# Patient Record
Sex: MALE | Race: BLACK OR AFRICAN AMERICAN | Employment: UNEMPLOYED | URBAN - METROPOLITAN AREA
[De-identification: names, ages, dates, MRNs, and addresses within clinical notes are randomized per-mention and may not be internally consistent; named-entity substitution may affect disease eponyms.]

---

## 2019-07-17 ENCOUNTER — HOSPITAL ENCOUNTER (EMERGENCY)
Age: 39
Discharge: HOME | End: 2019-07-17
Attending: EMERGENCY MEDICINE
Payer: SELF-PAY

## 2019-07-17 VITALS
OXYGEN SATURATION: 98 % | SYSTOLIC BLOOD PRESSURE: 156 MMHG | HEART RATE: 64 BPM | TEMPERATURE: 98 F | BODY MASS INDEX: 33.75 KG/M2 | RESPIRATION RATE: 20 BRPM | HEIGHT: 66 IN | DIASTOLIC BLOOD PRESSURE: 84 MMHG | WEIGHT: 210 LBS

## 2019-07-17 DIAGNOSIS — Z20.2 EXPOSURE TO STD: Primary | ICD-10-CM

## 2019-07-17 PROCEDURE — 99283 EMERGENCY DEPT VISIT LOW MDM: CPT

## 2019-07-17 PROCEDURE — 74011250636 HC RX REV CODE- 250/636: Performed by: NURSE PRACTITIONER

## 2019-07-17 PROCEDURE — 74011250637 HC RX REV CODE- 250/637: Performed by: NURSE PRACTITIONER

## 2019-07-17 RX ORDER — AZITHROMYCIN 500 MG/1
1000 TABLET, FILM COATED ORAL
Status: COMPLETED | OUTPATIENT
Start: 2019-07-17 | End: 2019-07-17

## 2019-07-17 RX ADMIN — AZITHROMYCIN 1000 MG: 500 TABLET, FILM COATED ORAL at 12:34

## 2019-07-17 RX ADMIN — LIDOCAINE HYDROCHLORIDE 250 MG: 10 INJECTION, SOLUTION EPIDURAL; INFILTRATION; INTRACAUDAL; PERINEURAL at 12:34

## 2019-07-17 NOTE — ED NOTES
Emergency Department Nursing Plan of Care       The Nursing Plan of Care is developed from the Nursing assessment and Emergency Department Attending provider initial evaluation. The plan of care may be reviewed in the ED Provider note.     The Plan of Care was developed with the following considerations:   Patient / Family readiness to learn indicated by:verbalized understanding  Persons(s) to be included in education: patient  Barriers to Learning/Limitations:No    [facility]    7/17/2019   12:22 PM

## 2019-07-17 NOTE — ED NOTES
Pt here for treatment of chlamydia. Denies urinary symptoms. Denies wanting to wait for urine sample and 'just wants to be treated'.

## 2019-07-17 NOTE — ED TRIAGE NOTES
Pt. Reports difficulty urinating X a couple days. Pt. Denies burning with urination/discharge. Pt. States sexual partner tested positive for Chlamydia.

## 2019-07-17 NOTE — ED PROVIDER NOTES
EMERGENCY DEPARTMENT HISTORY AND PHYSICAL EXAM    Date: 7/17/2019  Patient Name: Nickey Duverney    History of Presenting Illness     Chief Complaint   Patient presents with    Penile Discharge         History Provided By: Patient     Chief Complaint: Treatment for STD      HPI: Nickey Duverney is a 46 y.o. male with a PMH of No significant past medical history who presents with request for treatment for chlamydia. Patient states his partner told him she had chlamydia. His partner was treated. Patient denies penile discharge she denies dysuria hematuria flank pain abdominal pain nausea vomiting patient states she just wants to be treated for the STD. Because there are no symptoms or complaints of pain, there is no reported quality, severity, modifying factors, or associated signs and symptoms reported. PCP: No primary care provider on file. Past History     Past Medical History:  History reviewed. No pertinent past medical history. Past Surgical History:  History reviewed. No pertinent surgical history. Family History:  History reviewed. No pertinent family history. Social History:  Social History     Tobacco Use    Smoking status: Never Smoker    Smokeless tobacco: Never Used   Substance Use Topics    Alcohol use: Not Currently    Drug use: Not Currently       Allergies:  No Known Allergies      Review of Systems   Review of Systems   Constitutional: Negative for chills, fatigue and fever. HENT: Negative for congestion and sore throat. Eyes: Negative for redness. Respiratory: Negative for cough, chest tightness and wheezing. Cardiovascular: Negative for chest pain. Gastrointestinal: Negative for abdominal pain. Genitourinary: Negative for dysuria. Musculoskeletal: Negative for arthralgias, back pain, myalgias, neck pain and neck stiffness. Skin: Negative for rash. Neurological: Negative for dizziness, syncope, weakness, light-headedness, numbness and headaches.    Hematological: Negative for adenopathy. All other systems reviewed and are negative. Physical Exam     Vitals:    07/17/19 1218   BP: 156/84   Pulse: 64   Resp: 20   Temp: 98 °F (36.7 °C)   SpO2: 98%   Weight: 95.3 kg (210 lb)   Height: 5' 6\" (1.676 m)     Physical Exam   Constitutional: He is oriented to person, place, and time. He appears well-developed and well-nourished. HENT:   Head: Normocephalic and atraumatic. Right Ear: External ear normal.   Mouth/Throat: Oropharynx is clear and moist.   Eyes: Conjunctivae are normal. Right eye exhibits no discharge. Left eye exhibits no discharge. Neck: Normal range of motion. Neck supple. Cardiovascular: Normal rate, regular rhythm and normal heart sounds. Pulmonary/Chest: Effort normal and breath sounds normal. No respiratory distress. He has no wheezes. Abdominal: Soft. Bowel sounds are normal. There is no tenderness. Musculoskeletal: Normal range of motion. He exhibits no edema. Lymphadenopathy:     He has no cervical adenopathy. Neurological: He is alert and oriented to person, place, and time. No cranial nerve deficit. Skin: Skin is warm and dry. Psychiatric: He has a normal mood and affect. His behavior is normal. Judgment and thought content normal.   Nursing note and vitals reviewed. Diagnostic Study Results     Labs -   No results found for this or any previous visit (from the past 12 hour(s)). Radiologic Studies -   No orders to display     CT Results  (Last 48 hours)    None        CXR Results  (Last 48 hours)    None            Medical Decision Making   I am the first provider for this patient. I reviewed the vital signs, available nursing notes, past medical history, past surgical history, family history and social history. Vital Signs-Reviewed the patient's vital signs. Records Reviewed: Nursing Notes            Disposition:  Home    patient declined urinalysis stated he just wanted treatment for gonorrhea and chlamydia. Patient advised of need for urinalysis. Patient declined. 12:35 PM  I have discussed with patient their diagnosis, treatment, and follow up plan. The patient agrees to follow up as outlined in discharge paperwork and also to return to the ED with any worsening. Pam Valadez, NP      Follow-up Information     Follow up With Specialties Details Why [address]  In 1 week As needed [address] [id number]  [phone]          There are no discharge medications for this patient. Provider Notes (Medical Decision Making):   DDX exposure to STI MAGGIE gonorrhea  Procedures:  Procedures        Diagnosis     Clinical Impression:   1.  Exposure to STD

## 2019-07-17 NOTE — DISCHARGE INSTRUCTIONS
Patient Education        Exposure to Sexually Transmitted Infections: Care Instructions  Your Care Instructions  Sexually transmitted infections (STIs) are those diseases spread by sexual contact. There are at least 20 different STIs, including chlamydia, gonorrhea, syphilis, and human immunodeficiency virus (HIV), which causes AIDS. Bacteria-caused STIs can be treated and cured. STIs caused by viruses, such as HIV, can be treated but not cured. Some STIs can reduce a woman's chances of getting pregnant in the future. STIs are spread during sexual contact, such as vaginal intercourse and oral or anal sex. Follow-up care is a key part of your treatment and safety. Be sure to make and go to all appointments, and call your doctor if you are having problems. It's also a good idea to know your test results and keep a list of the medicines you take. How can you care for yourself at home? · Your doctor may have given you a shot of antibiotics. If your doctor prescribed antibiotic pills, take them as directed. Do not stop taking them just because you feel better. You need to take the full course of antibiotics. · Do not have sexual contact while you have symptoms of an STI or are being treated for an STI. · Tell your sex partner (or partners) that he or she will need treatment. · If you are a woman, do not douche. Douching changes the normal balance of bacteria in the vagina and may spread an infection up into your reproductive organs. To prevent exposure to STIs in the future  · Use latex condoms every time you have sex. Use them from the beginning to the end of sexual contact. · Talk to your partner before you have sex. Find out if he or she has or is at risk for any STI. Keep in mind that a person may be able to spread an STI even if he or she does not have symptoms. · Do not have sex if you are being treated for an STI.   · Do not have sex with anyone who has symptoms of an STI, such as sores on the genitals or mouth.  · Having one sex partner (who does not have STIs and does not have sex with anyone else) is a good way to avoid STIs. When should you call for help? Call your doctor now or seek immediate medical care if:    · You have new pain in your belly or pelvis.     · You have symptoms of a urinary tract infection. These may include:  ? Pain or burning when you urinate. ? A frequent need to urinate without being able to pass much urine. ? Pain in the flank, which is just below the rib cage and above the waist on either side of the back. ? Blood in your urine. ? A fever.     · You have new or worsening pain or swelling in the scrotum.    Watch closely for changes in your health, and be sure to contact your doctor if:    · You have unusual vaginal bleeding.     · You have a discharge from the vagina or penis.     · You have any new symptoms, such as sores, bumps, rashes, blisters, or warts.     · You have itching, tingling, pain, or burning in the genital or anal area.     · You think you may have an STI. Where can you learn more? Go to http://xiomara-jacob.info/. Enter S719 in the search box to learn more about \"Exposure to Sexually Transmitted Infections: Care Instructions. \"  Current as of: September 11, 2018  Content Version: 11.9  © 5422-0583 [employer]. Care instructions adapted under license by [employer] (which disclaims liability or warranty for this information). If you have questions about a medical condition or this instruction, always ask your healthcare professional. [place] [address] any warranty or liability for your use of this information.

## 2023-03-19 NOTE — PROGRESS NOTES
Patient is new to this clinic. Comes in to establish care. Previous care was with . Reason for the change is: CC dyspnea      Reid Navarro (: 1980) is a 40 y.o. male, new patient, here for evaluation of the following chief complaint(s):  No chief complaint on file. Complains of shortness of breath symptoms noted more in the last few months    ASSESSMENT/PLAN:  Below is the assessment and plan developed based on review of pertinent history, physical exam, labs, studies, and medications. ICD-10-CM ICD-9-CM    1. Dyspnea, unspecified type  R06.00 786.09 AMB POC EKG ROUTINE W/ 12 LEADS, INTER & REP      METABOLIC PANEL, COMPREHENSIVE      CBC W/O DIFF      TSH 3RD GENERATION      XR CHEST PA LAT      METABOLIC PANEL, COMPREHENSIVE      CBC W/O DIFF      TSH 3RD GENERATION      2. Mixed hyperlipidemia  E78.2 272.2 LIPID PANEL      LIPID PANEL      3. Constipation, unspecified constipation type  K59.00 564.00         Orders Placed This Encounter    XR CHEST PA LAT     Standing Status:   Future     Standing Expiration Date:   2024    LIPID PANEL     Standing Status:   Future     Number of Occurrences:   1     Standing Expiration Date:       METABOLIC PANEL, COMPREHENSIVE     Standing Status:   Future     Number of Occurrences:   1     Standing Expiration Date:   2023    CBC W/O DIFF     Standing Status:   Future     Number of Occurrences:   1     Standing Expiration Date:   2023    TSH 3RD GENERATION     Standing Status:   Future     Number of Occurrences:   1     Standing Expiration Date:   2023    AMB POC EKG ROUTINE W/ 12 LEADS, INTER & REP     Order Specific Question:   Reason for Exam:     Answer:   dyspnea uns     Work-up of the symptoms as above  Take daily laxative recommend Metamucil. Most likely COPD  The patient was counseled on the dangers of tobacco use, and was advised to quit.   Reviewed strategies to maximize success, including stress management. Follow-up 3 weeks discussed labs results. And routine health maintenance      SUBJECTIVE/OBJECTIVE:  HPI  Shortness of breath note some weight gain specially in the abdomen area. Gets dyspneic with more exertions. Switch from smoking cigars to vaping nicotine a few months ago but he smoked for years. Has quit before going to do so again. Wants a physical exam to rule out diabetes other causes of his symptoms. Review of Systems   Constitutional:  Negative for activity change. Respiratory:  Positive for shortness of breath. Negative for chest tightness. Gastrointestinal:  Positive for abdominal distention and constipation. Negative for abdominal pain. Bowels are irregular once every few days gets a large BM. There is no problem list on file for this patient. No family history on file. Social History     Socioeconomic History    Marital status: SINGLE     Spouse name: Not on file    Number of children: Not on file    Years of education: Not on file    Highest education level: Not on file   Occupational History    Occupation:    Tobacco Use    Smoking status: Former     Types: Cigars     Start date: 1999     Quit date: 2023     Years since quittin.2     Passive exposure: Never    Smokeless tobacco: Never   Vaping Use    Vaping Use: Every day    Substances: Nicotine   Substance and Sexual Activity    Alcohol use:  Yes     Alcohol/week: 20.0 standard drinks     Types: 20 Shots of liquor per week    Drug use: Never    Sexual activity: Not on file   Other Topics Concern    Not on file   Social History Narrative    Lives with parents     Social Determinants of Health     Financial Resource Strain: Low Risk     Difficulty of Paying Living Expenses: Not hard at all   Food Insecurity: No Food Insecurity    Worried About Running Out of Food in the Last Year: Never true    Ran Out of Food in the Last Year: Never true   Transportation Needs: No Transportation Needs    Lack of Transportation (Medical): No    Lack of Transportation (Non-Medical): No   Physical Activity: Not on file   Stress: No Stress Concern Present    Feeling of Stress : Only a little   Social Connections: Not on file   Intimate Partner Violence: Not At Risk    Fear of Current or Ex-Partner: No    Emotionally Abused: No    Physically Abused: No    Sexually Abused: No   Housing Stability: Unknown    Unable to Pay for Housing in the Last Year: No    Number of [place] in the Last Year: Not on file    Unstable Housing in the Last Year: No       Physical Exam  Visit Vitals  /84 (BP 1 Location: Left arm, BP Patient Position: Sitting, BP Cuff Size: Adult)   Pulse 70   Temp 98.6 °F (37 °C) (Temporal)   Resp 16   Ht 5' 6\" (1.676 m)   Wt 225 lb (102.1 kg)   SpO2 97%   BMI 36.32 kg/m²       WDWN NAD  TM clear, throat wnl  Neck no adenopathy  Heart RRR no C/M/R  Lungs CTA  Abdo soft non tender  Ext No redness swelling or edema    EKG is within normal limits      An electronic signature was used to authenticate this note.   -- Bobby Goldmann, MD

## 2023-03-20 ENCOUNTER — OFFICE VISIT (OUTPATIENT)
Dept: INTERNAL MEDICINE CLINIC | Age: 43
End: 2023-03-20
Payer: MEDICAID

## 2023-03-20 VITALS
HEART RATE: 70 BPM | BODY MASS INDEX: 36.16 KG/M2 | TEMPERATURE: 98.6 F | DIASTOLIC BLOOD PRESSURE: 84 MMHG | RESPIRATION RATE: 16 BRPM | WEIGHT: 225 LBS | SYSTOLIC BLOOD PRESSURE: 127 MMHG | OXYGEN SATURATION: 97 % | HEIGHT: 66 IN

## 2023-03-20 DIAGNOSIS — R06.00 DYSPNEA, UNSPECIFIED TYPE: Primary | ICD-10-CM

## 2023-03-20 DIAGNOSIS — K59.00 CONSTIPATION, UNSPECIFIED CONSTIPATION TYPE: ICD-10-CM

## 2023-03-20 DIAGNOSIS — E78.2 MIXED HYPERLIPIDEMIA: ICD-10-CM

## 2023-03-20 PROCEDURE — 99203 OFFICE O/P NEW LOW 30 MIN: CPT | Performed by: FAMILY MEDICINE

## 2023-03-20 NOTE — PROGRESS NOTES
Chief Complaint   Patient presents with    Establish Care     Patient has not been out of the country in (14 months), NO diarrhea, NO cough, NO chest conjestion, NO temp. Pt has not been around anyone with these symptoms. Health Maintenance reviewed. I have reviewed the patient's medical history in detail and updated the computerized patient record. 1. \"Have you been to the ER, urgent care clinic since your last visit? No Hospitalized since your last visit? \" no    2. \"Have you seen or consulted any other health care providers outside of the [address] [place] [address] since your last visit? \" no   3. For patients aged 39-70: Has the patient had a colonoscopy / FIT/ Cologuard? no    Encouraged pt to discuss pt's wishes with spouse/partner/family and bring them in the next appt to follow thru with the Advanced Directive    @No flowsheet data found. 3 most recent PHQ Screens 3/20/2023   Little interest or pleasure in doing things Several days   Feeling down, depressed, irritable, or hopeless Several days   Total Score PHQ 2 2       Abuse Screening Questionnaire 3/20/2023   Do you ever feel afraid of your partner? N   Are you in a relationship with someone who physically or mentally threatens you? N   Is it safe for you to go home?  Y       ADL Assessment 3/20/2023   Feeding yourself No Help Needed   Getting from bed to chair No Help Needed   Getting dressed No Help Needed   Bathing or showering No Help Needed   Walk across the room (includes cane/walker) No Help Needed   Using the telphone No Help Needed   Taking your medications No Help Needed   Preparing meals No Help Needed   Managing money (expenses/bills) No Help Needed   Moderately strenuous housework (laundry) No Help Needed   Shopping for personal items (toiletries/medicines) No Help Needed   Shopping for groceries No Help Needed   Driving No Help Needed   Climbing a flight of stairs No Help Needed   Getting to places beyond walking distances No Help Needed

## 2023-03-30 ENCOUNTER — TELEPHONE (OUTPATIENT)
Dept: INTERNAL MEDICINE CLINIC | Age: 43
End: 2023-03-30

## 2023-04-17 ENCOUNTER — TELEPHONE (OUTPATIENT)
Dept: INTERNAL MEDICINE CLINIC | Age: 43
End: 2023-04-17

## 2023-06-01 ENCOUNTER — TELEMEDICINE (OUTPATIENT)
Facility: CLINIC | Age: 43
End: 2023-06-01
Payer: MEDICAID

## 2023-06-01 DIAGNOSIS — N52.9 ERECTILE DYSFUNCTION, UNSPECIFIED ERECTILE DYSFUNCTION TYPE: ICD-10-CM

## 2023-06-01 DIAGNOSIS — Z11.59 ENCOUNTER FOR HCV SCREENING TEST FOR LOW RISK PATIENT: ICD-10-CM

## 2023-06-01 DIAGNOSIS — K59.01 SLOW TRANSIT CONSTIPATION: Primary | ICD-10-CM

## 2023-06-01 PROCEDURE — 99214 OFFICE O/P EST MOD 30 MIN: CPT | Performed by: FAMILY MEDICINE

## 2023-06-03 DIAGNOSIS — K59.01 SLOW TRANSIT CONSTIPATION: ICD-10-CM

## 2023-06-03 PROBLEM — N52.9 ERECTILE DYSFUNCTION: Status: ACTIVE | Noted: 2023-06-03

## 2023-06-05 ENCOUNTER — TELEPHONE (OUTPATIENT)
Facility: CLINIC | Age: 43
End: 2023-06-05

## 2023-06-05 NOTE — TELEPHONE ENCOUNTER
PCP Henrietta Hashimoto, MD added more labs for patient to have completed. Called patient and verified name and date of birth. Pt has appt with gastro Friday. Will  lab orders in office.

## 2024-02-10 ENCOUNTER — APPOINTMENT (OUTPATIENT)
Facility: HOSPITAL | Age: 44
End: 2024-02-10

## 2024-02-10 ENCOUNTER — HOSPITAL ENCOUNTER (EMERGENCY)
Facility: HOSPITAL | Age: 44
Discharge: HOME | End: 2024-02-10
Attending: STUDENT IN AN ORGANIZED HEALTH CARE EDUCATION/TRAINING PROGRAM

## 2024-02-10 VITALS
DIASTOLIC BLOOD PRESSURE: 91 MMHG | WEIGHT: 199 LBS | OXYGEN SATURATION: 99 % | RESPIRATION RATE: 20 BRPM | BODY MASS INDEX: 31.98 KG/M2 | SYSTOLIC BLOOD PRESSURE: 148 MMHG | HEIGHT: 66 IN | TEMPERATURE: 98.4 F | HEART RATE: 88 BPM

## 2024-02-10 DIAGNOSIS — I26.99 PULMONARY INFARCT (HCC): ICD-10-CM

## 2024-02-10 DIAGNOSIS — I26.99 ACUTE PULMONARY EMBOLISM WITHOUT ACUTE COR PULMONALE, UNSPECIFIED PULMONARY EMBOLISM TYPE (HCC): Primary | ICD-10-CM

## 2024-02-10 LAB
ALBUMIN SERPL-MCNC: 3.9 G/DL (ref 3.5–5)
ALBUMIN/GLOB SERPL: 0.8 (ref 1.1–2.2)
ALP SERPL-CCNC: 83 U/L (ref 45–117)
ALT SERPL-CCNC: 33 U/L (ref 12–78)
ANION GAP SERPL CALC-SCNC: 10 MMOL/L (ref 5–15)
AST SERPL-CCNC: 19 U/L (ref 15–37)
BASOPHILS # BLD: 0 K/UL (ref 0–0.1)
BASOPHILS NFR BLD: 0 % (ref 0–1)
BILIRUB SERPL-MCNC: 0.4 MG/DL (ref 0.2–1)
BUN SERPL-MCNC: 8 MG/DL (ref 6–20)
BUN/CREAT SERPL: 7 (ref 12–20)
CALCIUM SERPL-MCNC: 9.1 MG/DL (ref 8.5–10.1)
CHLORIDE SERPL-SCNC: 103 MMOL/L (ref 97–108)
CO2 SERPL-SCNC: 30 MMOL/L (ref 21–32)
CREAT SERPL-MCNC: 1.19 MG/DL (ref 0.7–1.3)
D DIMER PPP FEU-MCNC: 0.61 MG/L FEU (ref 0–0.65)
DIFFERENTIAL METHOD BLD: ABNORMAL
EKG ATRIAL RATE: 87 BPM
EKG DIAGNOSIS: NORMAL
EKG P AXIS: 36 DEGREES
EKG P-R INTERVAL: 154 MS
EKG Q-T INTERVAL: 338 MS
EKG QRS DURATION: 80 MS
EKG QTC CALCULATION (BAZETT): 406 MS
EKG R AXIS: 95 DEGREES
EKG T AXIS: 29 DEGREES
EKG VENTRICULAR RATE: 87 BPM
EOSINOPHIL # BLD: 0 K/UL (ref 0–0.4)
EOSINOPHIL NFR BLD: 1 % (ref 0–7)
ERYTHROCYTE [DISTWIDTH] IN BLOOD BY AUTOMATED COUNT: 12.9 % (ref 11.5–14.5)
GLOBULIN SER CALC-MCNC: 4.7 G/DL (ref 2–4)
GLUCOSE SERPL-MCNC: 105 MG/DL (ref 65–100)
HCT VFR BLD AUTO: 52.5 % (ref 36.6–50.3)
HGB BLD-MCNC: 17.6 G/DL (ref 12.1–17)
IMM GRANULOCYTES # BLD AUTO: 0 K/UL (ref 0–0.04)
IMM GRANULOCYTES NFR BLD AUTO: 0 % (ref 0–0.5)
LIPASE SERPL-CCNC: 32 U/L (ref 13–75)
LYMPHOCYTES # BLD: 1.6 K/UL (ref 0.8–3.5)
LYMPHOCYTES NFR BLD: 27 % (ref 12–49)
MAGNESIUM SERPL-MCNC: 2 MG/DL (ref 1.6–2.4)
MCH RBC QN AUTO: 30.1 PG (ref 26–34)
MCHC RBC AUTO-ENTMCNC: 33.5 G/DL (ref 30–36.5)
MCV RBC AUTO: 89.9 FL (ref 80–99)
MONOCYTES # BLD: 0.5 K/UL (ref 0–1)
MONOCYTES NFR BLD: 8 % (ref 5–13)
NEUTS SEG # BLD: 3.8 K/UL (ref 1.8–8)
NEUTS SEG NFR BLD: 64 % (ref 32–75)
NRBC # BLD: 0 K/UL (ref 0–0.01)
NRBC BLD-RTO: 0 PER 100 WBC
PLATELET # BLD AUTO: 164 K/UL (ref 150–400)
PMV BLD AUTO: 10.5 FL (ref 8.9–12.9)
POTASSIUM SERPL-SCNC: 3.9 MMOL/L (ref 3.5–5.1)
PROT SERPL-MCNC: 8.6 G/DL (ref 6.4–8.2)
RBC # BLD AUTO: 5.84 M/UL (ref 4.1–5.7)
SODIUM SERPL-SCNC: 143 MMOL/L (ref 136–145)
TROPONIN I SERPL HS-MCNC: 8 NG/L (ref 0–76)
WBC # BLD AUTO: 6 K/UL (ref 4.1–11.1)

## 2024-02-10 PROCEDURE — 85379 FIBRIN DEGRADATION QUANT: CPT

## 2024-02-10 PROCEDURE — 71045 X-RAY EXAM CHEST 1 VIEW: CPT

## 2024-02-10 PROCEDURE — 2580000003 HC RX 258: Performed by: STUDENT IN AN ORGANIZED HEALTH CARE EDUCATION/TRAINING PROGRAM

## 2024-02-10 PROCEDURE — 6370000000 HC RX 637 (ALT 250 FOR IP): Performed by: STUDENT IN AN ORGANIZED HEALTH CARE EDUCATION/TRAINING PROGRAM

## 2024-02-10 PROCEDURE — 84484 ASSAY OF TROPONIN QUANT: CPT

## 2024-02-10 PROCEDURE — 71275 CT ANGIOGRAPHY CHEST: CPT

## 2024-02-10 PROCEDURE — 85025 COMPLETE CBC W/AUTO DIFF WBC: CPT

## 2024-02-10 PROCEDURE — 83690 ASSAY OF LIPASE: CPT

## 2024-02-10 PROCEDURE — 83735 ASSAY OF MAGNESIUM: CPT

## 2024-02-10 PROCEDURE — 96374 THER/PROPH/DIAG INJ IV PUSH: CPT

## 2024-02-10 PROCEDURE — 93005 ELECTROCARDIOGRAM TRACING: CPT | Performed by: STUDENT IN AN ORGANIZED HEALTH CARE EDUCATION/TRAINING PROGRAM

## 2024-02-10 PROCEDURE — 80053 COMPREHEN METABOLIC PANEL: CPT

## 2024-02-10 PROCEDURE — 6360000004 HC RX CONTRAST MEDICATION: Performed by: STUDENT IN AN ORGANIZED HEALTH CARE EDUCATION/TRAINING PROGRAM

## 2024-02-10 PROCEDURE — 6360000002 HC RX W HCPCS: Performed by: STUDENT IN AN ORGANIZED HEALTH CARE EDUCATION/TRAINING PROGRAM

## 2024-02-10 PROCEDURE — 99285 EMERGENCY DEPT VISIT HI MDM: CPT

## 2024-02-10 PROCEDURE — 96361 HYDRATE IV INFUSION ADD-ON: CPT

## 2024-02-10 RX ORDER — 0.9 % SODIUM CHLORIDE 0.9 %
1000 INTRAVENOUS SOLUTION INTRAVENOUS ONCE
Status: COMPLETED | OUTPATIENT
Start: 2024-02-10 | End: 2024-02-10

## 2024-02-10 RX ORDER — KETOROLAC TROMETHAMINE 30 MG/ML
30 INJECTION, SOLUTION INTRAMUSCULAR; INTRAVENOUS
Status: COMPLETED | OUTPATIENT
Start: 2024-02-10 | End: 2024-02-10

## 2024-02-10 RX ADMIN — KETOROLAC TROMETHAMINE 30 MG: 30 INJECTION INTRAMUSCULAR; INTRAVENOUS at 00:51

## 2024-02-10 RX ADMIN — SODIUM CHLORIDE 1000 ML: 9 INJECTION, SOLUTION INTRAVENOUS at 01:38

## 2024-02-10 RX ADMIN — IOPAMIDOL 100 ML: 755 INJECTION, SOLUTION INTRAVENOUS at 02:21

## 2024-02-10 RX ADMIN — APIXABAN 10 MG: 5 TABLET, FILM COATED ORAL at 03:21

## 2024-02-10 ASSESSMENT — PAIN - FUNCTIONAL ASSESSMENT: PAIN_FUNCTIONAL_ASSESSMENT: 0-10

## 2024-02-10 ASSESSMENT — PAIN SCALES - GENERAL: PAINLEVEL_OUTOF10: 10

## 2024-02-10 NOTE — ED NOTES
Patient (s)  given copy of dc instructions and 1 script(s).  Patient (s)  verbalized understanding of instructions and script (s).  Patient given a current medication reconciliation form and verbalized understanding of their medications.   Patient (s) verbalized understanding of the importance of discussing medications with  his or her physician or clinic they will be following up with.  Patient alert and oriented and in no acute distress.  Patient discharged home ambulatory with significant other.

## 2024-02-10 NOTE — ED PROVIDER NOTES
[facility] EMERGENCY DEPT  EMERGENCY DEPARTMENT ENCOUNTER       Pt Name: Rodrick Gamboa  MRN: [id number]  Birthdate 1980  Date of evaluation: 2/10/2024  Provider: Nathaniel Sanchez MD   PCP: Harjinder Amaya MD  Note Started: 12:36 AM EST 2/10/24     CHIEF COMPLAINT       Chief Complaint   Patient presents with    Abdominal Cramping    torso cramping        HISTORY OF PRESENT ILLNESS: 1 or more elements      History From: Patient  HPI Limitations: None     Rodrick Gamboa is a 43 y.o. male who presents for right-sided chest pain that started earlier today.  Patient denies any past medical history.  Takes no prescription medications.  Denies nausea, vomiting.  He reports the pain is on the right side of his chest, radiates to the right shoulder blade.  No history of this pain.  He reports the pain is worse with deep breathing.  No history of PE or DVT.  Denies falls or injuries.  Denies history of heart disease.  Tobacco smoker, social alcohol use, denies drug use.  Denies nausea, vomiting, diarrhea, rash, fever, chills.  Denies recent illnesses.  Denies cough.       Nursing Notes were all reviewed and agreed with or any disagreements were addressed in the HPI.     REVIEW OF SYSTEMS      Review of Systems     Positives and Pertinent negatives as per HPI.    PAST HISTORY     Past Medical History:  History reviewed. No pertinent past medical history.      Past Surgical History:  Past Surgical History:   Procedure Laterality Date    FRACTURE SURGERY Right     hand       Family History:  History reviewed. No pertinent family history.    Social History:  Social History     Tobacco Use    Smoking status: Some Days     Types: Cigars    Smokeless tobacco: Never   Substance Use Topics    Alcohol use: Yes     Alcohol/week: 20.0 standard drinks of alcohol    Drug use: Never       Allergies:  No Known Allergies    CURRENT MEDICATIONS      Discharge Medication List as of 2/10/2024  2:55 AM          SCREENINGS               No data recorded  errors are inadvertently transcribed by the computer software. Please disregards these errors. Please excuse any errors that have escaped final proofreading.)         Nathaniel Sanchez MD  02/10/24 0408

## 2024-02-10 NOTE — ED TRIAGE NOTES
Pt presents to ED with pain in his right chest and right abd for several days. Pt reports pain is cramping and constant. Pain increases with respirations. Pt denies SOB. Pt is a+ox4. Skin is warm and dry. Respirations are even and unlabored.

## 2024-02-10 NOTE — ED NOTES
Pt presents ambulatory to ED with c/o constant, right sided chest pain radiating to right upper side of back that started on 2/8/24. Pt reports pain worsens with inspiration, denies SOB otherwise. Pt denies N/V/D, cough, or extremity swelling. Pt denies recent falls or injuries. Pt is A&Ox4, RR even & unlabored, skin is warm & dry. Pt in NAD, updated on plan of care, call light within reach.      Emergency Department Nursing Plan of Care       The Nursing Plan of Care is developed from the Nursing assessment and Emergency Department Attending provider initial evaluation.  The plan of care may be reviewed in the “ED Provider note”.    The Plan of Care was developed with the following considerations:   Patient / Family readiness to learn indicated by:verbalized understanding  Persons(s) to be included in education: patient  Barriers to Learning/Limitations: No    Signed     Melissa Neves RN    2/10/2024   2:40 AM

## 2024-02-10 NOTE — PROGRESS NOTES
Apixaban Order Review  Indication: treatment DVT/PE  Dose: 10mg once dose   Current Labs:  Recent Labs     02/10/24  0046   CREATININE 1.19   HGB 17.6*        Est CrCL: 85 ml/min    Drug Interactions: none   Plan: Continue current regimen    [facility] DOAC Dosing Guide

## 2024-02-12 LAB
EKG ATRIAL RATE: 87 BPM
EKG DIAGNOSIS: NORMAL
EKG P AXIS: 36 DEGREES
EKG P-R INTERVAL: 154 MS
EKG Q-T INTERVAL: 338 MS
EKG QRS DURATION: 80 MS
EKG QTC CALCULATION (BAZETT): 406 MS
EKG R AXIS: 95 DEGREES
EKG T AXIS: 29 DEGREES
EKG VENTRICULAR RATE: 87 BPM

## 2024-02-12 PROCEDURE — 93010 ELECTROCARDIOGRAM REPORT: CPT | Performed by: SPECIALIST

## 2025-01-09 ENCOUNTER — HOSPITAL ENCOUNTER (EMERGENCY)
Facility: HOSPITAL | Age: 45
Discharge: HOME | End: 2025-01-09
Attending: EMERGENCY MEDICINE
Payer: MEDICAID

## 2025-01-09 VITALS
SYSTOLIC BLOOD PRESSURE: 170 MMHG | BODY MASS INDEX: 32.05 KG/M2 | OXYGEN SATURATION: 99 % | HEIGHT: 65 IN | RESPIRATION RATE: 19 BRPM | HEART RATE: 71 BPM | WEIGHT: 192.35 LBS | TEMPERATURE: 98.7 F | DIASTOLIC BLOOD PRESSURE: 105 MMHG

## 2025-01-09 DIAGNOSIS — J06.9 ACUTE UPPER RESPIRATORY INFECTION: Primary | ICD-10-CM

## 2025-01-09 DIAGNOSIS — R03.0 ELEVATED BLOOD PRESSURE READING: ICD-10-CM

## 2025-01-09 LAB
FLUAV RNA SPEC QL NAA+PROBE: NOT DETECTED
FLUBV RNA SPEC QL NAA+PROBE: NOT DETECTED
SARS-COV-2 RNA RESP QL NAA+PROBE: NOT DETECTED
SOURCE: NORMAL

## 2025-01-09 PROCEDURE — 99283 EMERGENCY DEPT VISIT LOW MDM: CPT

## 2025-01-09 PROCEDURE — 87636 SARSCOV2 & INF A&B AMP PRB: CPT

## 2025-01-09 RX ORDER — METHOCARBAMOL 500 MG/1
1000 TABLET, FILM COATED ORAL
Status: DISCONTINUED | OUTPATIENT
Start: 2025-01-09 | End: 2025-01-10 | Stop reason: HOSPADM

## 2025-01-09 RX ORDER — FLUTICASONE PROPIONATE 50 MCG
1 SPRAY, SUSPENSION (ML) NASAL DAILY
Qty: 1 EACH | Refills: 0 | Status: SHIPPED | OUTPATIENT
Start: 2025-01-09 | End: 2025-01-23

## 2025-01-09 RX ORDER — GUAIFENESIN/DEXTROMETHORPHAN 100-10MG/5
5 SYRUP ORAL 3 TIMES DAILY PRN
Qty: 120 ML | Refills: 0 | Status: SHIPPED | OUTPATIENT
Start: 2025-01-09 | End: 2025-01-09

## 2025-01-09 RX ORDER — GUAIFENESIN/DEXTROMETHORPHAN 100-10MG/5
5 SYRUP ORAL 3 TIMES DAILY PRN
Qty: 120 ML | Refills: 0 | Status: SHIPPED | OUTPATIENT
Start: 2025-01-09 | End: 2025-01-19

## 2025-01-09 RX ORDER — FLUTICASONE PROPIONATE 50 MCG
1 SPRAY, SUSPENSION (ML) NASAL DAILY
Qty: 1 EACH | Refills: 0 | Status: SHIPPED | OUTPATIENT
Start: 2025-01-09 | End: 2025-01-09

## 2025-01-09 ASSESSMENT — PAIN SCALES - GENERAL: PAINLEVEL_OUTOF10: 8

## 2025-01-09 ASSESSMENT — PAIN DESCRIPTION - DESCRIPTORS: DESCRIPTORS: ACHING

## 2025-01-09 ASSESSMENT — LIFESTYLE VARIABLES
HOW OFTEN DO YOU HAVE A DRINK CONTAINING ALCOHOL: NEVER
HOW MANY STANDARD DRINKS CONTAINING ALCOHOL DO YOU HAVE ON A TYPICAL DAY: PATIENT DOES NOT DRINK

## 2025-01-09 ASSESSMENT — PAIN - FUNCTIONAL ASSESSMENT: PAIN_FUNCTIONAL_ASSESSMENT: 0-10

## 2025-01-09 ASSESSMENT — PAIN DESCRIPTION - LOCATION: LOCATION: GENERALIZED

## 2025-01-10 NOTE — ED PROVIDER NOTES
[facility] EMERGENCY DEPARTMENT  EMERGENCY DEPARTMENT ENCOUNTER       Pt Name: Rodrick Gamboa  MRN: [id number]  Birthdate 1980  Date of evaluation: 1/9/2025  Provider: Daniel Hart MD   PCP: Harjinder Amaya MD  Note Started: 9:24 PM EST 1/9/25     CHIEF COMPLAINT       Chief Complaint   Patient presents with    Cold Symptoms     Body aches, congestion x2 days        HISTORY OF PRESENT ILLNESS: 1 or more elements      History From: patient, History limited by: none     Rodrick Gamboa is a 44 y.o. male with a history as below anticoagulated on eliquis presents to the emergency department with a chief complaint of myalgias.    Patient denies any sick contacts, but reports 2 to 3 days of congestion, sore throat and nonproductive cough.  Reports myalgias as well as feeling \"hot and cold.\"  Reports no chest pain, no shortness of breath.  No abdominal pain, nausea, vomiting or diarrhea.     Please See MDM for Additional Details of the HPI/PMH  Nursing Notes were all reviewed and agreed with or any disagreements were addressed in the HPI.     REVIEW OF SYSTEMS        Positives and Pertinent negatives as per HPI.    PAST HISTORY     Past Medical History:  No past medical history on file.    Past Surgical History:  Past Surgical History:   Procedure Laterality Date    FRACTURE SURGERY Right     hand       Family History:  No family history on file.    Social History:  Social History     Tobacco Use    Smoking status: Some Days     Types: Cigars    Smokeless tobacco: Never   Substance Use Topics    Alcohol use: Yes     Alcohol/week: 20.0 standard drinks of alcohol    Drug use: Never       Allergies:  No Known Allergies    CURRENT MEDICATIONS      Current Discharge Medication List        CONTINUE these medications which have NOT CHANGED    Details   apixaban (ELIQUIS) 5 MG TABS tablet Take 2 tablets by mouth 2 times daily for 7 days  Qty: 28 tablet, Refills: 0      apixaban (ELIQUIS) 5 MG TABS tablet Take 1 tablet by

## 2025-01-10 NOTE — ED NOTES
Discharge instructions were given to the patient by Blank MCLAUGHLIN.     The patient left the Emergency Department alert and oriented and in no acute distress with 2 prescriptions. The patient was encouraged to call or return to the ED for worsening issues or problems and was encouraged to schedule a follow up appointment for continuing care.     Ambulation assessment completed before discharge.  Pt left Emergency Department ambulating at baseline with no ortho devices  Ortho device education: none    The patient verbalized understanding of discharge instructions and prescriptions, all questions were answered. The patient has no further concerns at this time.

## 2025-01-10 NOTE — ED TRIAGE NOTES
Pt presents to ED with CC body aches, cough, congestion x2 days  Pt reports taking Theraflu at 4pm, no relief

## 2025-01-10 NOTE — ED NOTES
Pt presents ambulatory to ED complaining of persistent dry cough, body aches and congestion for 2 days. Pt reports taking Theraflu and Mucinex without relief. BP elevated on arrival. Pt is not on BP meds. Pt is alert and oriented x 4, RR even and unlabored, skin is warm and dry. Assesment completed and pt updated on plan of care.       Emergency Department Nursing Plan of Care       The Nursing Plan of Care is developed from the Nursing assessment and Emergency Department Attending provider initial evaluation.  The plan of care may be reviewed in the “ED Provider note”.    The Plan of Care was developed with the following considerations:   Patient / Family readiness to learn indicated by:verbalized understanding  Persons(s) to be included in education: patient  Barriers to Learning/Limitations:None    Signed     Blank Hernandez RN    1/9/2025   9:17 PM

## 2025-01-10 NOTE — DISCHARGE INSTRUCTIONS
You are seen in the department for your symptoms.  Please use the nose spray and cough medication.  Please follow-up with your primary care doctor return for new or worsening symptoms anytime.

## 2025-02-26 ENCOUNTER — HOSPITAL ENCOUNTER (EMERGENCY)
Facility: HOSPITAL | Age: 45
Discharge: HOME | End: 2025-02-26
Payer: MEDICAID

## 2025-02-26 VITALS
DIASTOLIC BLOOD PRESSURE: 100 MMHG | HEART RATE: 59 BPM | TEMPERATURE: 98.5 F | RESPIRATION RATE: 16 BRPM | BODY MASS INDEX: 31.46 KG/M2 | SYSTOLIC BLOOD PRESSURE: 160 MMHG | WEIGHT: 195.77 LBS | OXYGEN SATURATION: 98 % | HEIGHT: 66 IN

## 2025-02-26 DIAGNOSIS — M79.601 PAIN OF RIGHT UPPER EXTREMITY: Primary | ICD-10-CM

## 2025-02-26 DIAGNOSIS — R20.2 PARESTHESIA: ICD-10-CM

## 2025-02-26 PROCEDURE — 99283 EMERGENCY DEPT VISIT LOW MDM: CPT

## 2025-02-26 RX ORDER — PREDNISONE 10 MG/1
TABLET ORAL
Qty: 1 EACH | Refills: 0 | Status: SHIPPED | OUTPATIENT
Start: 2025-02-26

## 2025-02-26 RX ORDER — OXYCODONE AND ACETAMINOPHEN 5; 325 MG/1; MG/1
1 TABLET ORAL EVERY 6 HOURS PRN
Qty: 12 TABLET | Refills: 0 | Status: SHIPPED | OUTPATIENT
Start: 2025-02-26 | End: 2025-03-01

## 2025-02-26 ASSESSMENT — LIFESTYLE VARIABLES
HOW OFTEN DO YOU HAVE A DRINK CONTAINING ALCOHOL: 2-3 TIMES A WEEK
HOW MANY STANDARD DRINKS CONTAINING ALCOHOL DO YOU HAVE ON A TYPICAL DAY: 1 OR 2

## 2025-02-26 ASSESSMENT — PAIN - FUNCTIONAL ASSESSMENT: PAIN_FUNCTIONAL_ASSESSMENT: NONE - DENIES PAIN

## 2025-02-26 ASSESSMENT — PAIN SCALES - GENERAL: PAINLEVEL_OUTOF10: 0

## 2025-02-27 NOTE — ED PROVIDER NOTES
[facility] EMERGENCY DEPT  EMERGENCY DEPARTMENT HISTORY AND PHYSICAL EXAM      Date: 2/26/2025  Patient Name: Rodrick Gamboa  MRN: [id number]  YOB: 1980  Date of evaluation: 2/26/2025  Provider: BREA Loco NP   Note Started: 9:04 PM EST 2/26/25    HISTORY OF PRESENT ILLNESS     Chief Complaint   Patient presents with    Numbness     Patient to triage with R sided tingling/numbness from R neck to fingertips starting one week ago. Patient saw PCP yesterday and was prescribed meloxicam; prescription is not relieving any symptoms. Patient denies any additional symptoms.       History Provided By: Patient    HPI: Rodrick Gamboa is a 44 y.o. male with past medical history as listed below, presents ambulatory to the emergency department with complaints of a numbness/tingling sensation in his right arm and neck for about 1 week.  Seen by his PCP yesterday, was prescribed meloxicam, took 2 doses but did not get any relief.  Patient states he declined muscle relaxers from his PCP because it caused him to have bad dreams. Denies extremity weakness, headache, vision changes, difficulty thinking/speaking, neck/arm injury, chest pain, difficulty breathing and is otherwise in his usual state of health. Upon arrival to the ED pt is alert and oriented x 3, well-appearing, and interacting appropriately; no obvious distress noted.      PAST MEDICAL HISTORY   Past Medical History:  No past medical history on file.    Past Surgical History:  Past Surgical History:   Procedure Laterality Date    FRACTURE SURGERY Right     hand       Family History:  No family history on file.    Social History:  Social History     Tobacco Use    Smoking status: Some Days     Types: Cigars    Smokeless tobacco: Never   Substance Use Topics    Alcohol use: Yes     Alcohol/week: 20.0 standard drinks of alcohol    Drug use: Never       Allergies:  No Known Allergies    PCP: Harjinder Amaya MD    Current Meds:   No current facility-administered